# Patient Record
Sex: FEMALE | Race: WHITE | URBAN - METROPOLITAN AREA
[De-identification: names, ages, dates, MRNs, and addresses within clinical notes are randomized per-mention and may not be internally consistent; named-entity substitution may affect disease eponyms.]

---

## 2017-01-12 ASSESSMENT — ENCOUNTER SYMPTOMS
DYSPNEA ON EXERTION: 0
MYALGIAS: 1
NAIL CHANGES: 0
WEIGHT LOSS: 0
DYSURIA: 0
FATIGUE: 1
SNORES LOUDLY: 1
HEMATURIA: 0
ARTHRALGIAS: 1
POOR WOUND HEALING: 0
RESPIRATORY PAIN: 0
SKIN CHANGES: 0
MUSCLE WEAKNESS: 0
STIFFNESS: 0
BREAST PAIN: 1
CHILLS: 0
HALLUCINATIONS: 0
INCREASED ENERGY: 0
DECREASED APPETITE: 0
NECK PAIN: 1
SHORTNESS OF BREATH: 0
COUGH DISTURBING SLEEP: 0
BREAST MASS: 1
WEIGHT GAIN: 1
HEMOPTYSIS: 0
FLANK PAIN: 0
NIGHT SWEATS: 1
JOINT SWELLING: 0
DIFFICULTY URINATING: 0
COUGH: 1
POSTURAL DYSPNEA: 0
SPUTUM PRODUCTION: 1
FEVER: 0
POLYPHAGIA: 0
POLYDIPSIA: 0
MUSCLE CRAMPS: 1
ALTERED TEMPERATURE REGULATION: 1
WHEEZING: 0
BACK PAIN: 1

## 2017-01-17 ENCOUNTER — ONCOLOGY VISIT (OUTPATIENT)
Dept: ONCOLOGY | Facility: CLINIC | Age: 37
End: 2017-01-17
Attending: INTERNAL MEDICINE
Payer: COMMERCIAL

## 2017-01-17 VITALS
HEIGHT: 67 IN | SYSTOLIC BLOOD PRESSURE: 129 MMHG | BODY MASS INDEX: 45.99 KG/M2 | RESPIRATION RATE: 16 BRPM | OXYGEN SATURATION: 95 % | HEART RATE: 95 BPM | DIASTOLIC BLOOD PRESSURE: 86 MMHG | WEIGHT: 293 LBS | TEMPERATURE: 98.8 F

## 2017-01-17 DIAGNOSIS — D89.42 IDIOPATHIC MAST CELL ACTIVATION SYNDROME (H): Primary | Chronic | ICD-10-CM

## 2017-01-17 PROCEDURE — 99213 OFFICE O/P EST LOW 20 MIN: CPT

## 2017-01-17 PROCEDURE — 99213 OFFICE O/P EST LOW 20 MIN: CPT | Mod: ZP | Performed by: INTERNAL MEDICINE

## 2017-01-17 RX ORDER — CETIRIZINE HYDROCHLORIDE 10 MG/1
10 TABLET ORAL DAILY
Qty: 30 TABLET | Refills: 11 | COMMUNITY
Start: 2017-01-17

## 2017-01-17 RX ORDER — NYSTATIN 100000 U/G
CREAM TOPICAL DAILY PRN
COMMUNITY
Start: 2016-02-17

## 2017-01-17 RX ORDER — PROMETHAZINE HYDROCHLORIDE 25 MG/1
25 TABLET ORAL AT BEDTIME
Qty: 56 TABLET | Refills: 11 | COMMUNITY
Start: 2017-01-17

## 2017-01-17 RX ORDER — ALBUTEROL SULFATE 0.83 MG/ML
2.5 SOLUTION RESPIRATORY (INHALATION) DAILY PRN
COMMUNITY
Start: 2016-04-11

## 2017-01-17 RX ORDER — LANCETS
EACH MISCELLANEOUS
COMMUNITY
Start: 2007-08-15

## 2017-01-17 RX ORDER — KETOCONAZOLE 20 MG/ML
SHAMPOO TOPICAL DAILY PRN
COMMUNITY
Start: 2016-08-19

## 2017-01-17 RX ORDER — HYDROXYZINE HYDROCHLORIDE 10 MG/1
10 TABLET, FILM COATED ORAL DAILY PRN
COMMUNITY

## 2017-01-17 RX ORDER — EPINEPHRINE 0.3 MG/.3ML
0.3 INJECTION SUBCUTANEOUS DAILY PRN
COMMUNITY
Start: 2015-08-31

## 2017-01-17 RX ORDER — NAPROXEN SODIUM 220 MG
TABLET ORAL
COMMUNITY
Start: 2016-09-22

## 2017-01-17 RX ORDER — CETIRIZINE HYDROCHLORIDE 10 MG/1
10 TABLET ORAL 2 TIMES DAILY
COMMUNITY
End: 2017-01-17

## 2017-01-17 ASSESSMENT — PAIN SCALES - GENERAL: PAINLEVEL: NO PAIN (0)

## 2017-01-17 NOTE — NURSING NOTE
"Cheli Figueroa is a 36 year old female who presents for:  Chief Complaint   Patient presents with     Oncology Clinic Visit     Mast cell activation f/u         Initial Vitals:  /86 mmHg  Pulse 95  Temp(Src) 98.8  F (37.1  C) (Oral)  Resp 16  Ht 1.702 m (5' 7.01\")  Wt 133.539 kg (294 lb 6.4 oz)  BMI 46.10 kg/m2  SpO2 95%  LMP 12/22/2016 Estimated body mass index is 46.1 kg/(m^2) as calculated from the following:    Height as of this encounter: 1.702 m (5' 7.01\").    Weight as of this encounter: 133.539 kg (294 lb 6.4 oz).. Body surface area is 2.51 meters squared. BP completed using cuff size: X-large  No Pain (0) Patient's last menstrual period was 12/22/2016. Allergies and medications reviewed.     Medications: Medication refills not needed today.  Pharmacy name entered into [employer]:    [facility] [place], [place] - [phone]  [facility] - [place], [place] - [address]  [facility] [place], [place] - [address]  [id number] - [place], [place] - [address] AT [facility] [address]  [id number] - [place], [place] - [address] [facility] [address]  [facility] [place], [place] - [address] [facility] [address]  [id number] - [place] - [address] [facility] [place] - [address] [facility] - [place], [place] - [address]  [facility] - [place], [place] - [address]    Comments:     10 minutes for nursing intake (face to face time)   Montserrat Craig LPN        "
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
## 2017-04-03 ENCOUNTER — OFFICE VISIT (OUTPATIENT)
Dept: FAMILY MEDICINE | Facility: CLINIC | Age: 37
End: 2017-04-03
Payer: COMMERCIAL

## 2017-04-03 VITALS
WEIGHT: 284 LBS | SYSTOLIC BLOOD PRESSURE: 122 MMHG | BODY MASS INDEX: 44.57 KG/M2 | HEART RATE: 83 BPM | TEMPERATURE: 99.3 F | OXYGEN SATURATION: 97 % | DIASTOLIC BLOOD PRESSURE: 83 MMHG | HEIGHT: 67 IN

## 2017-04-03 DIAGNOSIS — J03.90 TONSILLITIS: ICD-10-CM

## 2017-04-03 DIAGNOSIS — R07.0 THROAT PAIN: Primary | ICD-10-CM

## 2017-04-03 DIAGNOSIS — E10.9 TYPE 1 DIABETES MELLITUS WITHOUT COMPLICATION (H): ICD-10-CM

## 2017-04-03 DIAGNOSIS — D89.42 IDIOPATHIC MAST CELL ACTIVATION SYNDROME (H): Chronic | ICD-10-CM

## 2017-04-03 LAB
DEPRECATED S PYO AG THROAT QL EIA: NORMAL
MICRO REPORT STATUS: NORMAL
SPECIMEN SOURCE: NORMAL

## 2017-04-03 PROCEDURE — 87081 CULTURE SCREEN ONLY: CPT | Performed by: FAMILY MEDICINE

## 2017-04-03 PROCEDURE — 87880 STREP A ASSAY W/OPTIC: CPT | Performed by: FAMILY MEDICINE

## 2017-04-03 PROCEDURE — 99214 OFFICE O/P EST MOD 30 MIN: CPT | Performed by: FAMILY MEDICINE

## 2017-04-03 RX ORDER — AMOXICILLIN 875 MG
875 TABLET ORAL 2 TIMES DAILY
Qty: 14 TABLET | Refills: 0 | Status: SHIPPED | OUTPATIENT
Start: 2017-04-03 | End: 2017-04-10

## 2017-04-03 NOTE — PROGRESS NOTES
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 Hx of tonsillitis. In past     different form previous    Sugars been good     Took a couple correction doses for seeming no reason usual reason when she is sick          Takes prn med's   Only regular med's are as follows :   Uses allegra and montelukast twice a day   Ativan twice a day lowered dose recently   And ranitidine twice a day for mast cell     Regular levothyroxine post surgery for an enlarged thyroid( goiter )      Under care of endocrine at [facility], , hematology at the  and regular primary at [facility]     She is just here for an acute visit     Problem list and histories reviewed & adjusted, as indicated.  Additional history: as documented    Patient Active Problem List   Diagnosis     Postsurgical hypothyroidism     CARDIOVASCULAR SCREENING; LDL GOAL LESS THAN 70     Type 1 diabetes, HbA1c goal < 7% (H)     Mild persistent asthma     Morbid obesity (H)     Other procreative management counseling and advice     Pain in limb     Idiopathic mast cell activation syndrome     Attention deficit disorder     Atopic rhinitis     Diarrhea     Gastroparesis     Generalized anxiety disorder     Hyperlipidemia     Lumbar radiculopathy     Major depressive  "disorder, single episode, moderate (H)     Extrinsic asthma     Nausea and vomiting     Posttraumatic stress disorder     Type 1 diabetes mellitus (H)     Vitamin D deficiency     Nausea & vomiting     Past Surgical History:   Procedure Laterality Date     CHOLECYSTECTOMY  6/2010     COLONOSCOPY       FOOT SURGERY  2003    L foot surgery, had rheumatoid nodule removed     GASTROSCOPY,[place]      multiple     HC BREATH HYDROGEN TEST  12/28/2011    Procedure:HYDROGEN BREATH TEST; Surgeon:GILLIAN VALADEZ; Location:[facility]     THYROIDECTOMY  11/2010     TUBAL LIGATION  2003       Social History   Substance Use Topics     Smoking status: Former Smoker     Quit date: 4/2/2011     Smokeless tobacco: Never Used     Alcohol use No     Family History   Problem Relation Age of Onset     DIABETES Father      No FHx of RA, SLE or malignancy     DIABETES Paternal Grandmother          Current Outpatient Prescriptions   Medication Sig Dispense Refill     montelukast (SINGULAIR) 10 MG tablet Take 1 tablet (10 mg) by mouth 2 times daily 180 tablet 3     insulin infusion pump (Bloc VIBE) kit Insulin pump       Continuous Glucose Monitor (DEXCOM G4 PLATINUM ) KIT        EPINEPHrine 0.3 MG/0.3ML injection Inject 0.3 mg into the muscle daily as needed       glucagon (GLUCAGON EMERGENCY) 1 MG kit Inject 1 mg Subcutaneous daily as needed       infusion set (INSET 23\" 6MM) misc pump supply Infusion Set 23 \" 6MM       insulin aspart (NOVOLOG VIAL) 100 UNITS/ML injection 64 basal units and sliding scale for bolus       insulin syringe 31G X 5/16\" 0.5 ML MISC        albuterol (2.5 MG/3ML) 0.083% neb solution Inhale 2.5 mg into the lungs daily as needed       blood glucose monitoring (ONETOUCH VERIO IQ SYSTEM) meter device kit        hydrOXYzine (ATARAX) 10 MG tablet Take 10 mg by mouth daily as needed       blood glucose monitoring (ONE TOUCH ULTRASOFT) lancets        nystatin (MYCOSTATIN) cream Apply topically daily as needed       " Respiratory Therapy Supplies (NEBULIZER) MARTHA        ketoconazole (NIZORAL) 2 % shampoo daily as needed       promethazine (PHENERGAN) 6.25 MG/5ML syrup daily as needed       cetirizine (ZYRTEC) 10 MG tablet Take 1 tablet (10 mg) by mouth daily 30 tablet 11     promethazine (PHENERGAN) 25 MG tablet Take 1 tablet (25 mg) by mouth At Bedtime 56 tablet 11     LORazepam (ATIVAN) 1 MG tablet Take 1 tablet (1 mg) by mouth 2 times daily 60 tablet 0     ranitidine (ZANTAC) 300 MG tablet Take 1 tablet (300 mg) by mouth 2 times daily 60 tablet 0     hydrOXYzine (VISTARIL) 25 MG capsule Take 2 capsules (50 mg) by mouth daily as needed 120 capsule 0     fluticasone-salmeterol (ADVAIR) 250-50 MCG/DOSE diskus inhaler Inhale 1 puff into the lungs 2 times daily as needed 1 Inhaler 0     diphenhydrAMINE (BENADRYL) 12.5 MG/5ML elixir Take 15 mLs by mouth 4 times daily as needed for allergies or sleep       Levothyroxine Sodium (SYNTHROID PO) Take 200 mcg by mouth daily       albuterol (PROAIR HFA) 108 (90 BASE) MCG/ACT inhaler Inhale 1-2 puffs into the lungs every 6 hours as needed. 1 Inhaler 2     ASPIRIN NOT PRESCRIBED, INTENTIONAL, Antiplatelet medication not prescribed intentionally due to age  0     ipratropium - albuterol 0.5 mg/2.5 mg/3 mL (DUONEB) 0.5-2.5 (3) MG/3ML nebulization Take 3 mLs by nebulization every 6 hours as needed for shortness of breath / dyspnea. 1 Box 3     Allergies   Allergen Reactions     Aspirin Anaphylaxis     Ibuprofen Anaphylaxis and Hives     Other reaction(s): Wheezing  SWELLING     Morphine Hives     Doxycycline Nausea and Vomiting     Other reaction(s): Gastrointestinal  Heart palpitations  Patient developed nausea and vomiting after she was started on Doxycycline. After discussion patient agreed to have medication be added to her allergy list.      Insulin Lispro      Other reaction(s): Other, see comments  Systemic allergic reaction and flare of mast cell disease.  Tolerates Novalog well but  UNABLE TO TOLERATE HUMALOG     Vicodin [Hydrocodone-Acetaminophen] Itching     Other reaction(s): Other, see comments     Biaxin [Clarithromycin] Palpitations     Other reaction(s): Gastrointestinal, Palpitations  PN Noted: [id number]  Swelling and hives     Cephalexin Palpitations     Other reaction(s): Gastrointestinal, Other, see comments  Heart palpitations  Dizzy, Shaky  Questionable allergy  Swelling and hives     Levaquin [Levofloxacin] Anxiety     Other reaction(s): Anxiety  Swelling and hives  Other reaction(s): Gastrointestinal  Heart palpitations  Swelling and hives     Recent Labs   Lab Test  06/14/16   0739  06/13/16   0646  06/12/16   1313  06/26/15   1406 08/15/13  01/22/13   1235   08/30/12   1305  04/20/12   0939   A1C   --   7.6*   --    --   7.9*   --    --   7.1*  7.7*   LDL   --    --    --    --   132   --    --    --   90   HDL   --    --    --    --   65   --    --    --   37*   TRIG   --    --    --    --   120   --    --    --   136   ALT   --    --   21  21   --   29   < >   --    --    CR  0.84  0.76  0.83  0.92   --   0.72   < >   --   0.75   GFRESTIMATED  76  86  77  70   --   >90   < >   --   90   GFRESTBLACK  >90   GFR Calc    >90   GFR Calc    >90   GFR Calc    84   --   >90   < >   --   >90   POTASSIUM  3.6  3.6  3.8  3.8   --   5.3   < >   --   4.4   TSH   --    --    --    --   59.08   --    --   0.12*   --     < > = values in this interval not displayed.      BP Readings from Last 3 Encounters:   04/03/17 122/83   01/17/17 129/86   07/13/16 163/88    Wt Readings from Last 3 Encounters:   04/03/17 284 lb (128.8 kg)   01/17/17 294 lb 6.4 oz (133.5 kg)   07/13/16 276 lb 9.6 oz (125.5 kg)                  Labs reviewed in EPIC    Reviewed and updated as needed this visit by clinical staff  Tobacco  Allergies  Meds  Med Hx  Surg Hx  Fam Hx  Soc Hx      Reviewed and updated as needed this visit by Provider      "    ROS:  Constitutional, HEENT, cardiovascular, pulmonary, GI, , musculoskeletal, neuro, skin, endocrine and psych systems are negative, except as otherwise noted.    OBJECTIVE:                                                    /83  Pulse 83  Temp 99.3  F (37.4  C) (Oral)  Ht 5' 7.01\" (1.702 m)  Wt 284 lb (128.8 kg)  SpO2 97%  BMI 44.47 kg/m2  Body mass index is 44.47 kg/(m^2).  GENERAL: healthy, alert and no distress  EYES: Eyes grossly normal to inspection, PERRL and conjunctivae and sclerae normal  HENT: normal cephalic/atraumatic, ear canals and TM's normal, nose and mouth without ulcers or lesions, oropharynx clear, oral mucous membranes moist, tonsillar hypertrophy and tonsillar erythema, no exudate or mid line shift or asymmetry seen, see old carters on tonsils, voice hoarse  NECK: no adenopathy, no asymmetry, masses, scars of previous thyroidectomy surgery present anterior neck   RESP: lungs clear to auscultation - no rales, rhonchi or wheezes  CV: regular rate and rhythm, normal S1 S2, no S3 or S4, no murmur, click or rub,ABDOMEN: soft, non tender, no hepatosplenomegaly, no masses and bowel sounds normal  MS: no gross musculoskeletal defects noted  SKIN: no suspicious lesions or rashes  NEURO: Normal strength and tone, mentation intact and speech normal  PSYCH: mentation appears normal, affect normal/bright    Diagnostic Test Results:  Results for orders placed or performed in visit on 04/03/17 (from the past 24 hour(s))   Strep, Rapid Screen   Result Value Ref Range    Specimen Description Throat     Rapid Strep A Screen       NEGATIVE: No Group A streptococcal antigen detected by immunoassay, await   culture report.      Micro Report Status FINAL 04/03/2017         ASSESSMENT/PLAN:                                                      1. Throat pain  Hx of mild persistent asthma, on Singulair, albuterol, DUONEB prn, atrophic rhinitis, type 1 DM on insulin pump and glucagon prn and dexa com " continuous monitoring, HDL, gastroparesis, GERD, post op hypothyroidism on levothyroxine ( thyroid removed due to benign goiter ) managed by endocrine, idiopathic mast cell activation syndrome managed by hematology, ADD, KEL< on lorazepam now down to bid, MDD, PTSD, lumbar radiculopathy, former smoker, prior TL, prior cholecystectomy, prior colonoscopy, remote JANET & Rf positive in the past , multiple allergies and intolerance. Including palpitations with keflex but reports tolerates all penicillins and amoxil fine. Hx of Enhler danlos hypermobility type, seen by hematology 1/17/17 note reviewed. [place] shows [place] lorazepam now down to 0,5 mg # 60 monthly last on 3/3/17 , phentermine 37.5 mg # 15 on 2/28/17 and tramadol 50 mg # 15 on 3/13/17. Here today for sore throat  Exam benign, strep negative. likely viral but given concern about left tonsil being re and larger though both look same to me and just show craters of past infection , given script of amoxil to hang on to.   Rapid strep came back negative. Will call if culture positive. Supportive care. Flonase 1 spray each nose daily 2 weeks to dry up post nasal drip that is likely causing sore throat. Continue resr of med's as is. If not better in 5 days fill scrip for amoxil with food 7 days. Continue care with endocrine, hematology and primary. Go to the ER if worse   - Strep, Rapid Screen  - amoxicillin (AMOXIL) 875 MG tablet; Take 1 tablet (875 mg) by mouth 2 times daily for 7 days  Dispense: 14 tablet; Refill: 0  - Beta strep group A culture    2. Tonsillitis  Likely viral   - amoxicillin (AMOXIL) 875 MG tablet; Take 1 tablet (875 mg) by mouth 2 times daily for 7 days  Dispense: 14 tablet; Refill: 0    3. Idiopathic mast cell activation syndrome  Stable continue care with hematology    4. Type 1 diabetes mellitus without complication (H)  Stable under care of endocrine     5. Asthma   Stable on med's       See Patient Instructions  Patient Instructions      Rapid strep came back negative  Will call if culture positive  Supportive care  flonase 1 spray each nose daily 2 weeks   Continue resr of meds  If not better in 5 days fill scrip for amoxil with food 7 days   Continue care with endocrine, hematology and primary   Go to the ER if worse       Mayuri Navarrete MD  [facility] [place]

## 2017-04-03 NOTE — NURSING NOTE
"Chief Complaint   Patient presents with     Pharyngitis     Initial /83  Pulse 83  Temp 99.3  F (37.4  C) (Oral)  Ht 5' 7.01\" (1.702 m)  Wt 284 lb (128.8 kg)  SpO2 97%  BMI 44.47 kg/m2 Estimated body mass index is 44.47 kg/(m^2) as calculated from the following:    Height as of this encounter: 5' 7.01\" (1.702 m).    Weight as of this encounter: 284 lb (128.8 kg)..  BP completed using cuff size: chris Cheatham MA   "

## 2017-04-03 NOTE — PATIENT INSTRUCTIONS
Rapid strep came back negative  Will call if culture positive  Supportive care  flonase 1 spray each nose daily 2 weeks   Continue resr of meds  If not better in 5 days fill scrip for amoxil with food 7 days   Continue care with endocrine, hematology and primary   Go to the ER if worse

## 2017-04-03 NOTE — MR AVS SNAPSHOT
After Visit Summary   4/3/2017    Cheli Figueroa    MRN: [id number]           Patient Information     Date Of Birth          1980        Visit Information        Provider Department      4/3/2017 2:00 PM Mayuri Navarrete MD [facility]        Today's Diagnoses     Throat pain    -  1    Tonsillitis        Idiopathic mast cell activation syndrome        Type 1 diabetes mellitus without complication (H)          Care Instructions      Rapid strep came back negative  Will call if culture positive  Supportive care  flonase 1 spray each nose daily 2 weeks   Continue resr of meds  If not better in 5 days fill scrip for amoxil with food 7 days   Continue care with endocrine, hematology and primary   Go to the ER if worse         Follow-ups after your visit        Your next 10 appointments already scheduled     Jan 19, 2018  9:30 AM CST   (Arrive by 9:15 AM)   Return Visit with Gary Washington MD   [facility] ([facility])    [address] [place] [address]  [address] [facility] [address] [phone]              Who to contact     If you have questions or need follow up information about today's clinic visit or your schedule please contact [facility] directly at [phone].  Normal or non-critical lab and imaging results will be communicated to you by MyChart, letter or phone within 4 business days after the clinic has received the results. If you do not hear from us within 7 days, please contact the clinic through MyChart or phone. If you have a critical or abnormal lab result, we will notify you by phone as soon as possible.  Submit refill requests through [employer] or call your pharmacy and they will forward the refill request to us. Please allow 3 business days for your refill to be completed.          Additional Information About Your Visit        niviohart Information     [employer] gives you secure access to your electronic  "health record. If you see a primary care provider, you can also send messages to your care team and make appointments. If you have questions, please call your primary care clinic.  If you do not have a primary care provider, please call [phone] and they will assist you.        Care EveryWhere ID     This is your Care EveryWhere ID. This could be used by other organizations to access your [place] medical records  [id number]        Your Vitals Were     Pulse Temperature Height Pulse Oximetry BMI (Body Mass Index)       83 99.3  F (37.4  C) (Oral) 5' 7.01\" (1.702 m) 97% 44.47 kg/m2        Blood Pressure from Last 3 Encounters:   04/03/17 122/83   01/17/17 129/86   07/13/16 163/88    Weight from Last 3 Encounters:   04/03/17 284 lb (128.8 kg)   01/17/17 294 lb 6.4 oz (133.5 kg)   07/13/16 276 lb 9.6 oz (125.5 kg)              We Performed the Following     Beta strep group A culture     Strep, Rapid Screen          Today's Medication Changes          These changes are accurate as of: 4/3/17  2:35 PM.  If you have any questions, ask your nurse or doctor.               Start taking these medicines.        Dose/Directions    amoxicillin 875 MG tablet   Commonly known as:  AMOXIL   Used for:  Throat pain, Tonsillitis   Started by:  Mayuri Navarrete MD        Dose:  875 mg   Take 1 tablet (875 mg) by mouth 2 times daily for 7 days   Quantity:  14 tablet   Refills:  0            Where to get your medicines      Some of these will need a paper prescription and others can be bought over the counter.  Ask your nurse if you have questions.     Bring a paper prescription for each of these medications     amoxicillin 875 MG tablet                Primary Care Provider Office Phone # Fax #    Frances Schroeder [phone]       [facility] [address]  [facility] [address]        Thank you!     Thank you for choosing [facility]  for your care. Our goal is always to " provide you with excellent care. Hearing back from our patients is one way we can continue to improve our services. Please take a few minutes to complete the written survey that you may receive in the mail after your visit with us. Thank you!             Your Updated Medication List - Protect others around you: Learn how to safely use, store and throw away your medicines at www.disposemymeds.org.          This list is accurate as of: 4/3/17  2:35 PM.  Always use your most recent med list.                   Brand Name Dispense Instructions for use    * albuterol 108 (90 BASE) MCG/ACT Inhaler   Generic drug:  albuterol     1 Inhaler    Inhale 1-2 puffs into the lungs every 6 hours as needed.       * albuterol (2.5 MG/3ML) 0.083% neb solution      Inhale 2.5 mg into the lungs daily as needed       amoxicillin 875 MG tablet    AMOXIL    14 tablet    Take 1 tablet (875 mg) by mouth 2 times daily for 7 days       ASPIRIN NOT PRESCRIBED    INTENTIONAL     Antiplatelet medication not prescribed intentionally due to age       ATIVAN 1 MG tablet   Generic drug:  LORazepam     60 tablet    Take 1 tablet (1 mg) by mouth 2 times daily       blood glucose monitoring lancets          blood glucose monitoring meter device kit          cetirizine 10 MG tablet    zyrTEC    30 tablet    Take 1 tablet (10 mg) by mouth daily       DEXCOM G4 PLATINUM  Kit          diphenhydrAMINE 12.5 MG/5ML solution    BENADRYL     Take 15 mLs by mouth 4 times daily as needed for allergies or sleep       EPINEPHrine 0.3 MG/0.3ML injection      Inject 0.3 mg into the muscle daily as needed       fluticasone-salmeterol 250-50 MCG/DOSE diskus inhaler    ADVAIR    1 Inhaler    Inhale 1 puff into the lungs 2 times daily as needed       GLUCAGON EMERGENCY 1 MG kit   Generic drug:  glucagon      Inject 1 mg Subcutaneous daily as needed       hydrOXYzine 10 MG tablet    ATARAX     Take 10 mg by mouth daily as needed       hydrOXYzine 25 MG capsule     "VISTARIL    120 capsule    Take 2 capsules (50 mg) by mouth daily as needed       infusion set [facility] pump supply      Infusion Set 23 \" 6MM       insulin infusion pump kit      Insulin pump       insulin syringe 31G X 5/16\" 0.5 ML Misc          ipratropium - albuterol 0.5 mg/2.5 mg/3 mL 0.5-2.5 (3) MG/3ML neb solution    DUONEB    1 Box    Take 3 mLs by nebulization every 6 hours as needed for shortness of breath / dyspnea.       ketoconazole 2 % shampoo    NIZORAL     daily as needed       montelukast 10 MG tablet    SINGULAIR    180 tablet    Take 1 tablet (10 mg) by mouth 2 times daily       nebulizer Debbie          NovoLOG VIAL 100 UNITS/ML injection   Generic drug:  insulin aspart      64 basal units and sliding scale for bolus       nystatin cream    MYCOSTATIN     Apply topically daily as needed       * promethazine 6.25 MG/5ML syrup    PHENERGAN     daily as needed       * promethazine 25 MG tablet    PHENERGAN    56 tablet    Take 1 tablet (25 mg) by mouth At Bedtime       SYNTHROID PO      Take 200 mcg by mouth daily       ZANTAC 300 MG tablet   Generic drug:  ranitidine     60 tablet    Take 1 tablet (300 mg) by mouth 2 times daily       * Notice:  This list has 4 medication(s) that are the same as other medications prescribed for you. Read the directions carefully, and ask your doctor or other care provider to review them with you.      "

## 2017-04-05 LAB
BACTERIA SPEC CULT: NORMAL
MICRO REPORT STATUS: NORMAL
SPECIMEN SOURCE: NORMAL

## 2017-04-05 NOTE — PROGRESS NOTES
Chris Waite Henry,  Your results came back negative for strep. Likely viral infection and not sure if antibiotic will really help. If you have any further concerns please do not hesitate to contact us by message, phone or making an appointment.  Have a good day   Dr Landon WEINBERG

## 2017-06-13 ENCOUNTER — OFFICE VISIT (OUTPATIENT)
Dept: FAMILY MEDICINE | Facility: CLINIC | Age: 37
End: 2017-06-13
Payer: COMMERCIAL

## 2017-06-13 VITALS
SYSTOLIC BLOOD PRESSURE: 120 MMHG | HEART RATE: 90 BPM | TEMPERATURE: 99.4 F | RESPIRATION RATE: 18 BRPM | BODY MASS INDEX: 46.42 KG/M2 | OXYGEN SATURATION: 97 % | WEIGHT: 293 LBS | DIASTOLIC BLOOD PRESSURE: 66 MMHG

## 2017-06-13 DIAGNOSIS — L72.3 SEBACEOUS CYST: Primary | ICD-10-CM

## 2017-06-13 PROCEDURE — 99213 OFFICE O/P EST LOW 20 MIN: CPT | Performed by: PHYSICIAN ASSISTANT

## 2017-06-13 NOTE — MR AVS SNAPSHOT
After Visit Summary   6/13/2017    Cheli Figueroa    MRN: [id number]           Patient Information     Date Of Birth          1980        Visit Information        Provider Department      6/13/2017 3:40 PM Tenisha Jason PA-C [facility]        Today's Diagnoses     Sebaceous cyst    -  1      Care Instructions    Keep area clean and dry.  No need for topical or oral antibiotic at this time.    Consider follow up with dermatology for possible I&D if no improvement with above x 1-2 weeks or with any worsening or changes in symptoms.          Follow-ups after your visit        Follow-up notes from your care team     Return if symptoms worsen or fail to improve.      Your next 10 appointments already scheduled     Jan 19, 2018  9:30 AM CST   (Arrive by 9:15 AM)   Return Visit with Gary Washington MD   [facility] ([facility])    [address] [place] [id number] [phone]              Who to contact     If you have questions or need follow up information about today's clinic visit or your schedule please contact [facility] directly at [phone].  Normal or non-critical lab and imaging results will be communicated to you by MyChart, letter or phone within 4 business days after the clinic has received the results. If you do not hear from us within 7 days, please contact the clinic through MyChart or phone. If you have a critical or abnormal lab result, we will notify you by phone as soon as possible.  Submit refill requests through [employer] or call your pharmacy and they will forward the refill request to us. Please allow 3 business days for your refill to be completed.          Additional Information About Your Visit        MyChart Information     [employer] gives you secure access to your electronic health record. If you see a primary care provider, you can also send messages to  your care team and make appointments. If you have questions, please call your primary care clinic.  If you do not have a primary care provider, please call [phone] and they will assist you.        Care EveryWhere ID     This is your Care EveryWhere ID. This could be used by other organizations to access your [facility] medical records  [id number]        Your Vitals Were     Pulse Temperature Respirations Pulse Oximetry BMI (Body Mass Index)       90 99.4  F (37.4  C) (Oral) 18 97% 46.42 kg/m2        Blood Pressure from Last 3 Encounters:   06/13/17 120/66   04/03/17 122/83   01/17/17 129/86    Weight from Last 3 Encounters:   06/13/17 296 lb 8 oz (134.5 kg)   04/03/17 284 lb (128.8 kg)   01/17/17 294 lb 6.4 oz (133.5 kg)              Today, you had the following     No orders found for display       Primary Care Provider Office Phone # Fax #    Frances Schroeder [phone]       [facility] [address]  [facility] [address]        Thank you!     Thank you for choosing [facility]  for your care. Our goal is always to provide you with excellent care. Hearing back from our patients is one way we can continue to improve our services. Please take a few minutes to complete the written survey that you may receive in the mail after your visit with us. Thank you!             Your Updated Medication List - Protect others around you: Learn how to safely use, store and throw away your medicines at www.disposemymeds.org.          This list is accurate as of: 6/13/17  3:55 PM.  Always use your most recent med list.                   Brand Name Dispense Instructions for use    * albuterol 108 (90 BASE) MCG/ACT Inhaler   Generic drug:  albuterol     1 Inhaler    Inhale 1-2 puffs into the lungs every 6 hours as needed.       * albuterol (2.5 MG/3ML) 0.083% neb solution      Inhale 2.5 mg into the lungs daily as needed       ASPIRIN NOT PRESCRIBED    INTENTIONAL      "Antiplatelet medication not prescribed intentionally due to age       ATIVAN 1 MG tablet   Generic drug:  LORazepam     60 tablet    Take 1 tablet (1 mg) by mouth 2 times daily       blood glucose monitoring lancets          blood glucose monitoring meter device kit          cetirizine 10 MG tablet    zyrTEC    30 tablet    Take 1 tablet (10 mg) by mouth daily       DEXCOM G4 PLATINUM  Kit          diphenhydrAMINE 12.5 MG/5ML solution    BENADRYL     Take 15 mLs by mouth 4 times daily as needed for allergies or sleep       EPINEPHrine 0.3 MG/0.3ML injection      Inject 0.3 mg into the muscle daily as needed       fluticasone-salmeterol 250-50 MCG/DOSE diskus inhaler    ADVAIR    1 Inhaler    Inhale 1 puff into the lungs 2 times daily as needed       GLUCAGON EMERGENCY 1 MG kit   Generic drug:  glucagon      Inject 1 mg Subcutaneous daily as needed       hydrOXYzine 10 MG tablet    ATARAX     Take 10 mg by mouth daily as needed       hydrOXYzine 25 MG capsule    VISTARIL    120 capsule    Take 2 capsules (50 mg) by mouth daily as needed       infusion set misc pump supply      Infusion Set 23 \" 6MM       insulin infusion pump kit      Insulin pump       insulin syringe 31G X 5/16\" 0.5 ML Misc          ipratropium - albuterol 0.5 mg/2.5 mg/3 mL 0.5-2.5 (3) MG/3ML neb solution    DUONEB    1 Box    Take 3 mLs by nebulization every 6 hours as needed for shortness of breath / dyspnea.       ketoconazole 2 % shampoo    NIZORAL     daily as needed       montelukast 10 MG tablet    SINGULAIR    180 tablet    Take 1 tablet (10 mg) by mouth 2 times daily       nebulizer Debbie          NovoLOG VIAL 100 UNITS/ML injection   Generic drug:  insulin aspart      64 basal units and sliding scale for bolus       nystatin cream    MYCOSTATIN     Apply topically daily as needed       * promethazine 6.25 MG/5ML syrup    PHENERGAN     daily as needed       * promethazine 25 MG tablet    PHENERGAN    56 tablet    Take 1 tablet (25 " mg) by mouth At Bedtime       SYNTHROID PO      Take 200 mcg by mouth daily       ZANTAC 300 MG tablet   Generic drug:  ranitidine     60 tablet    Take 1 tablet (300 mg) by mouth 2 times daily       * Notice:  This list has 4 medication(s) that are the same as other medications prescribed for you. Read the directions carefully, and ask your doctor or other care provider to review them with you.

## 2017-06-13 NOTE — PATIENT INSTRUCTIONS
Keep area clean and dry.  No need for topical or oral antibiotic at this time.  Encouraged warm compresses as needed as well.    Consider follow up with dermatology for possible I&D if no improvement with above x 1-2 weeks or with any worsening or changes in symptoms.

## 2017-06-13 NOTE — NURSING NOTE
"Chief Complaint   Patient presents with     Derm Problem       Initial /66 (BP Location: Right arm, Patient Position: Sitting, Cuff Size: Adult Large)  Pulse 90  Temp 99.4  F (37.4  C) (Oral)  Resp 18  Wt 296 lb 8 oz (134.5 kg)  SpO2 97%  BMI 46.42 kg/m2 Estimated body mass index is 46.42 kg/(m^2) as calculated from the following:    Height as of 4/3/17: 5' 7.01\" (1.702 m).    Weight as of this encounter: 296 lb 8 oz (134.5 kg).  Medication Reconciliation: complete     Anton Brenner CMA  "

## 2017-06-13 NOTE — PROGRESS NOTES
SUBJECTIVE:                                                    Cheli Figueroa is a 36 year old female who presents to clinic today for the following health issues:      Wound      Duration: few days    Description (location/character/radiation): wound like under left breast    Intensity:  none    Accompanying signs and symptoms: looks open and looks like something is coming out of it, no pus    History (similar episodes/previous evaluation): hx of cysts/boils    Therapies tried and outcome: None    History of cysts occasionally, but current one is worse than others in the past           Problem list and histories reviewed & adjusted, as indicated.  Additional history: as documented    Patient Active Problem List   Diagnosis     Postsurgical hypothyroidism     CARDIOVASCULAR SCREENING; LDL GOAL LESS THAN 70     Type 1 diabetes, HbA1c goal < 7% (H)     Mild persistent asthma     Morbid obesity (H)     Other procreative management counseling and advice     Pain in limb     Idiopathic mast cell activation syndrome     Attention deficit disorder     Atopic rhinitis     Diarrhea     Gastroparesis     Generalized anxiety disorder     Hyperlipidemia     Lumbar radiculopathy     Major depressive disorder, single episode, moderate (H)     Extrinsic asthma     Nausea and vomiting     Posttraumatic stress disorder     Type 1 diabetes mellitus (H)     Vitamin D deficiency     Nausea & vomiting     Past Surgical History:   Procedure Laterality Date     CHOLECYSTECTOMY  6/2010     COLONOSCOPY       FOOT SURGERY  2003    L foot surgery, had rheumatoid nodule removed     GASTROSCOPY,[place]      multiple     HC BREATH HYDROGEN TEST  12/28/2011    Procedure:HYDROGEN BREATH TEST; Surgeon:GILLIAN VALADEZ; Location:[facility]     THYROIDECTOMY  11/2010     TUBAL LIGATION  2003       Social History   Substance Use Topics     Smoking status: Former Smoker     Quit date: 4/2/2011     Smokeless tobacco: Never Used     Alcohol use No      "Family History   Problem Relation Age of Onset     DIABETES Father      No FHx of RA, SLE or malignancy     DIABETES Paternal Grandmother          Current Outpatient Prescriptions   Medication Sig Dispense Refill     montelukast (SINGULAIR) 10 MG tablet Take 1 tablet (10 mg) by mouth 2 times daily 180 tablet 3     insulin infusion pump (ANIMAS VIBE) kit Insulin pump       Continuous Glucose Monitor (DEXCOM G4 PLATINUM ) KIT        EPINEPHrine 0.3 MG/0.3ML injection Inject 0.3 mg into the muscle daily as needed       glucagon (GLUCAGON EMERGENCY) 1 MG kit Inject 1 mg Subcutaneous daily as needed       infusion set (INSET 23\" 6MM) misc pump supply Infusion Set 23 \" 6MM       insulin aspart (NOVOLOG VIAL) 100 UNITS/ML injection 64 basal units and sliding scale for bolus       insulin syringe 31G X 5/16\" 0.5 ML MISC        albuterol (2.5 MG/3ML) 0.083% neb solution Inhale 2.5 mg into the lungs daily as needed       blood glucose monitoring (ONETOUCH VERIO IQ SYSTEM) meter device kit        hydrOXYzine (ATARAX) 10 MG tablet Take 10 mg by mouth daily as needed       blood glucose monitoring (ONE TOUCH ULTRASOFT) lancets        nystatin (MYCOSTATIN) cream Apply topically daily as needed       Respiratory Therapy Supplies (NEBULIZER) MARTHA        ketoconazole (NIZORAL) 2 % shampoo daily as needed       promethazine (PHENERGAN) 6.25 MG/5ML syrup daily as needed       cetirizine (ZYRTEC) 10 MG tablet Take 1 tablet (10 mg) by mouth daily 30 tablet 11     promethazine (PHENERGAN) 25 MG tablet Take 1 tablet (25 mg) by mouth At Bedtime 56 tablet 11     ranitidine (ZANTAC) 300 MG tablet Take 1 tablet (300 mg) by mouth 2 times daily 60 tablet 0     hydrOXYzine (VISTARIL) 25 MG capsule Take 2 capsules (50 mg) by mouth daily as needed 120 capsule 0     fluticasone-salmeterol (ADVAIR) 250-50 MCG/DOSE diskus inhaler Inhale 1 puff into the lungs 2 times daily as needed 1 Inhaler 0     diphenhydrAMINE (BENADRYL) 12.5 MG/5ML elixir " Take 15 mLs by mouth 4 times daily as needed for allergies or sleep       Levothyroxine Sodium (SYNTHROID PO) Take 200 mcg by mouth daily       albuterol (PROAIR HFA) 108 (90 BASE) MCG/ACT inhaler Inhale 1-2 puffs into the lungs every 6 hours as needed. 1 Inhaler 2     ASPIRIN NOT PRESCRIBED, INTENTIONAL, Antiplatelet medication not prescribed intentionally due to age  0     ipratropium - albuterol 0.5 mg/2.5 mg/3 mL (DUONEB) 0.5-2.5 (3) MG/3ML nebulization Take 3 mLs by nebulization every 6 hours as needed for shortness of breath / dyspnea. 1 Box 3     LORazepam (ATIVAN) 1 MG tablet Take 1 tablet (1 mg) by mouth 2 times daily 60 tablet 0     Allergies   Allergen Reactions     Aspirin Anaphylaxis     Ibuprofen Anaphylaxis and Hives     Other reaction(s): Wheezing  SWELLING     Morphine Hives     Doxycycline Nausea and Vomiting     Other reaction(s): Gastrointestinal  Heart palpitations  Patient developed nausea and vomiting after she was started on Doxycycline. After discussion patient agreed to have medication be added to her allergy list.      Insulin Lispro      Other reaction(s): Other, see comments  Systemic allergic reaction and flare of mast cell disease.  Tolerates Novalog well but UNABLE TO TOLERATE HUMALOG     Vicodin [Hydrocodone-Acetaminophen] Itching     Other reaction(s): Other, see comments     Biaxin [Clarithromycin] Palpitations     Other reaction(s): Gastrointestinal, Palpitations  PN Noted: [id number]  Swelling and hives     Cephalexin Palpitations     Other reaction(s): Gastrointestinal, Other, see comments  Heart palpitations  Dizzy, Shaky  Questionable allergy  Swelling and hives     Levaquin [Levofloxacin] Anxiety     Other reaction(s): Anxiety  Swelling and hives  Other reaction(s): Gastrointestinal  Heart palpitations  Swelling and hives       Reviewed and updated as needed this visit by clinical staff  Tobacco  Allergies  Meds  Problems  Med Hx  Surg Hx  Fam Hx  Soc Hx        Reviewed  and updated as needed this visit by Provider  Allergies  Meds  Problems         ROS:  Constitutional, HEENT, cardiovascular, pulmonary, gi and gu systems are negative, except as otherwise noted.    OBJECTIVE:                                                    /66 (BP Location: Right arm, Patient Position: Sitting, Cuff Size: Adult Large)  Pulse 90  Temp 99.4  F (37.4  C) (Oral)  Resp 18  Wt 296 lb 8 oz (134.5 kg)  SpO2 97%  BMI 46.42 kg/m2  Body mass index is 46.42 kg/(m^2).  GENERAL: healthy, alert and no distress  RESP: lungs clear to auscultation - no rales, rhonchi or wheezes  CV: regular rate and rhythm, normal S1 S2, no S3 or S4, no murmur, click or rub, no peripheral edema and peripheral pulses strong  SKIN: simple <0.5 cm diameter cyst with pinpoint pustule and slight erythema surrounding  PSYCH: mentation appears normal, affect normal/bright    Diagnostic Test Results:  none      ASSESSMENT/PLAN:                                                        ICD-10-CM    1. Sebaceous cyst L72.3        Patient Instructions   Keep area clean and dry.  No need for topical or oral antibiotic at this time.  Encouraged warm compresses as needed as well.    Consider follow up with dermatology for possible I&D if no improvement with above x 1-2 weeks or with any worsening or changes in symptoms.      Tenisha Jason PA-C  [facility]

## 2017-06-14 ASSESSMENT — ASTHMA QUESTIONNAIRES: ACT_TOTALSCORE: 21

## 2017-06-17 ENCOUNTER — OFFICE VISIT (OUTPATIENT)
Dept: URGENT CARE | Facility: URGENT CARE | Age: 37
End: 2017-06-17
Payer: COMMERCIAL

## 2017-06-17 VITALS
WEIGHT: 293 LBS | BODY MASS INDEX: 47.09 KG/M2 | HEIGHT: 66 IN | SYSTOLIC BLOOD PRESSURE: 119 MMHG | TEMPERATURE: 98.1 F | OXYGEN SATURATION: 97 % | DIASTOLIC BLOOD PRESSURE: 74 MMHG | HEART RATE: 91 BPM

## 2017-06-17 DIAGNOSIS — L60.0 INGROWN TOENAIL WITHOUT INFECTION: Primary | ICD-10-CM

## 2017-06-17 PROCEDURE — 99213 OFFICE O/P EST LOW 20 MIN: CPT | Performed by: PHYSICIAN ASSISTANT

## 2017-06-17 NOTE — PROGRESS NOTES
"SUBJECTIVE:  Cheli Figueroa is a 36 year old female who presents to the clinic today for a rash.  Onset of rash was a few days ago .   Rash is improving.  Location of the rash: toe right 2nd.  Quality/symptoms of rash: redness   Symptoms are mild and rash seems to be improving.  Previous history of a similar rash? No  Toenail has been irritating. She has cut nail and given toe soaks daily.     Associated symptoms include: fever at home and none here in clinic.     Past Medical History:   Diagnosis Date     JANET positive      Asthma      Diabetes mellitus     type I     GERD (gastroesophageal reflux disease)      Hypothyroidism      Rheumatoid factor positive      Thyroid nodule     benign nodules, s/p total thyroidectomy     Current Outpatient Prescriptions   Medication Sig Dispense Refill     montelukast (SINGULAIR) 10 MG tablet Take 1 tablet (10 mg) by mouth 2 times daily 180 tablet 3     insulin infusion pump (SkinkersE) kit Insulin pump       Continuous Glucose Monitor (DEXCOM G4 PLATINUM ) KIT        EPINEPHrine 0.3 MG/0.3ML injection Inject 0.3 mg into the muscle daily as needed       glucagon (GLUCAGON EMERGENCY) 1 MG kit Inject 1 mg Subcutaneous daily as needed       infusion set (INSET 23\" 6MM) misc pump supply Infusion Set 23 \" 6MM       insulin aspart (NOVOLOG VIAL) 100 UNITS/ML injection 64 basal units and sliding scale for bolus       insulin syringe 31G X 5/16\" 0.5 ML MISC        albuterol (2.5 MG/3ML) 0.083% neb solution Inhale 2.5 mg into the lungs daily as needed       blood glucose monitoring (ONETOUCH VERIO IQ SYSTEM) meter device kit        hydrOXYzine (ATARAX) 10 MG tablet Take 10 mg by mouth daily as needed       blood glucose monitoring (ONE TOUCH ULTRASOFT) lancets        nystatin (MYCOSTATIN) cream Apply topically daily as needed       Respiratory Therapy Supplies (NEBULIZER) MARTHA        ketoconazole (NIZORAL) 2 % shampoo daily as needed       promethazine (PHENERGAN) 6.25 " "MG/5ML syrup daily as needed       cetirizine (ZYRTEC) 10 MG tablet Take 1 tablet (10 mg) by mouth daily 30 tablet 11     promethazine (PHENERGAN) 25 MG tablet Take 1 tablet (25 mg) by mouth At Bedtime 56 tablet 11     LORazepam (ATIVAN) 1 MG tablet Take 1 tablet (1 mg) by mouth 2 times daily 60 tablet 0     ranitidine (ZANTAC) 300 MG tablet Take 1 tablet (300 mg) by mouth 2 times daily 60 tablet 0     hydrOXYzine (VISTARIL) 25 MG capsule Take 2 capsules (50 mg) by mouth daily as needed 120 capsule 0     fluticasone-salmeterol (ADVAIR) 250-50 MCG/DOSE diskus inhaler Inhale 1 puff into the lungs 2 times daily as needed 1 Inhaler 0     diphenhydrAMINE (BENADRYL) 12.5 MG/5ML elixir Take 15 mLs by mouth 4 times daily as needed for allergies or sleep       Levothyroxine Sodium (SYNTHROID PO) Take 200 mcg by mouth daily       albuterol (PROAIR HFA) 108 (90 BASE) MCG/ACT inhaler Inhale 1-2 puffs into the lungs every 6 hours as needed. 1 Inhaler 2     ASPIRIN NOT PRESCRIBED, INTENTIONAL, Antiplatelet medication not prescribed intentionally due to age  0     ipratropium - albuterol 0.5 mg/2.5 mg/3 mL (DUONEB) 0.5-2.5 (3) MG/3ML nebulization Take 3 mLs by nebulization every 6 hours as needed for shortness of breath / dyspnea. 1 Box 3     Social History   Substance Use Topics     Smoking status: Former Smoker     Quit date: 4/2/2011     Smokeless tobacco: Never Used     Alcohol use No       ROS:  CONSTITUTIONAL:POSITIVE  for fever   INTEGUMENTARY/SKIN: POSITIVE for rash toes right    EXAM:   /74  Pulse 91  Temp 98.1  F (36.7  C) (Tympanic)  Ht 5' 6\" (1.676 m)  Wt 296 lb (134.3 kg)  SpO2 97%  BMI 47.78 kg/m2  GENERAL: alert, no acute distress.  SKIN: Rash description:    Distribution: localized  Location: toe  2nd right. Nail shorter with cutting. Lateral borders show no edema or erythema. No discharge.     GENERAL APPEARANCE: healthy, alert and no distress    ASSESSMENT:    1. Ingrown toenail without " infection  Mild and improving    PLAN:  1) daily foot soaks.   2) Follow-up with primary clinic if not improving over the next week.

## 2017-06-17 NOTE — NURSING NOTE
"Chief Complaint   Patient presents with     Urgent Care     Pt in clinic to have eval for right foot 2nd toe pain and fever.     Fever     Toenail       Initial /74  Pulse 91  Temp 98.1  F (36.7  C) (Tympanic)  Ht 5' 6\" (1.676 m)  Wt 296 lb (134.3 kg)  SpO2 97%  BMI 47.78 kg/m2 Estimated body mass index is 47.78 kg/(m^2) as calculated from the following:    Height as of this encounter: 5' 6\" (1.676 m).    Weight as of this encounter: 296 lb (134.3 kg).  Medication Reconciliation: complete   Doris Grady/ MA    "

## 2017-06-17 NOTE — MR AVS SNAPSHOT
After Visit Summary   6/17/2017    Cheli Figueroa    MRN: [id number]           Patient Information     Date Of Birth          1980        Visit Information        Provider Department      6/17/2017 1:55 PM Jose Grady PA-C [facility]        Today's Diagnoses     Ingrown toenail without infection    -  1       Follow-ups after your visit        Your next 10 appointments already scheduled     Jan 19, 2018  9:30 AM CST   (Arrive by 9:15 AM)   Return Visit with Gary Washington MD   [facility] ([facility] [place])    [address] [place] [id number] [phone]              Who to contact     If you have questions or need follow up information about today's clinic visit or your schedule please contact [facility] directly at [phone].  Normal or non-critical lab and imaging results will be communicated to you by MyChart, letter or phone within 4 business days after the clinic has received the results. If you do not hear from us within 7 days, please contact the clinic through MyChart or phone. If you have a critical or abnormal lab result, we will notify you by phone as soon as possible.  Submit refill requests through [employer] or call your pharmacy and they will forward the refill request to us. Please allow 3 business days for your refill to be completed.          Additional Information About Your Visit        MyChart Information     [employer] gives you secure access to your electronic health record. If you see a primary care provider, you can also send messages to your care team and make appointments. If you have questions, please call your primary care clinic.  If you do not have a primary care provider, please call [phone] and they will assist you.        Care EveryWhere ID     This is your Care EveryWhere ID. This could be used by other organizations to access your  "[place] medical records  [id number]        Your Vitals Were     Pulse Temperature Height Pulse Oximetry BMI (Body Mass Index)       91 98.1  F (36.7  C) (Tympanic) 5' 6\" (1.676 m) 97% 47.78 kg/m2        Blood Pressure from Last 3 Encounters:   06/17/17 119/74   06/13/17 120/66   04/03/17 122/83    Weight from Last 3 Encounters:   06/17/17 296 lb (134.3 kg)   06/13/17 296 lb 8 oz (134.5 kg)   04/03/17 284 lb (128.8 kg)              Today, you had the following     No orders found for display       Primary Care Provider Office Phone # Fax #    Frances Schroeder [phone]       [facility] [address] [address]  [facility] [address]        Thank you!     Thank you for choosing [facility]  for your care. Our goal is always to provide you with excellent care. Hearing back from our patients is one way we can continue to improve our services. Please take a few minutes to complete the written survey that you may receive in the mail after your visit with us. Thank you!             Your Updated Medication List - Protect others around you: Learn how to safely use, store and throw away your medicines at www.disposemymeds.org.          This list is accurate as of: 6/17/17  3:02 PM.  Always use your most recent med list.                   Brand Name Dispense Instructions for use    * albuterol 108 (90 BASE) MCG/ACT Inhaler   Generic drug:  albuterol     1 Inhaler    Inhale 1-2 puffs into the lungs every 6 hours as needed.       * albuterol (2.5 MG/3ML) 0.083% neb solution      Inhale 2.5 mg into the lungs daily as needed       ASPIRIN NOT PRESCRIBED    INTENTIONAL     Antiplatelet medication not prescribed intentionally due to age       ATIVAN 1 MG tablet   Generic drug:  LORazepam     60 tablet    Take 1 tablet (1 mg) by mouth 2 times daily       blood glucose monitoring lancets          blood glucose monitoring meter device kit          cetirizine 10 MG tablet    " "zyrTEC    30 tablet    Take 1 tablet (10 mg) by mouth daily       DEXCOM G4 PLATINUM  Kit          diphenhydrAMINE 12.5 MG/5ML solution    BENADRYL     Take 15 mLs by mouth 4 times daily as needed for allergies or sleep       EPINEPHrine 0.3 MG/0.3ML injection      Inject 0.3 mg into the muscle daily as needed       fluticasone-salmeterol 250-50 MCG/DOSE diskus inhaler    ADVAIR    1 Inhaler    Inhale 1 puff into the lungs 2 times daily as needed       GLUCAGON EMERGENCY 1 MG kit   Generic drug:  glucagon      Inject 1 mg Subcutaneous daily as needed       hydrOXYzine 10 MG tablet    ATARAX     Take 10 mg by mouth daily as needed       hydrOXYzine 25 MG capsule    VISTARIL    120 capsule    Take 2 capsules (50 mg) by mouth daily as needed       infusion set misc pump supply      Infusion Set 23 \" 6MM       insulin infusion pump kit      Insulin pump       insulin syringe 31G X 5/16\" 0.5 ML Misc          ipratropium - albuterol 0.5 mg/2.5 mg/3 mL 0.5-2.5 (3) MG/3ML neb solution    DUONEB    1 Box    Take 3 mLs by nebulization every 6 hours as needed for shortness of breath / dyspnea.       ketoconazole 2 % shampoo    NIZORAL     daily as needed       montelukast 10 MG tablet    SINGULAIR    180 tablet    Take 1 tablet (10 mg) by mouth 2 times daily       nebulizer Debbie          NovoLOG VIAL 100 UNITS/ML injection   Generic drug:  insulin aspart      64 basal units and sliding scale for bolus       nystatin cream    MYCOSTATIN     Apply topically daily as needed       * promethazine 6.25 MG/5ML syrup    PHENERGAN     daily as needed       * promethazine 25 MG tablet    PHENERGAN    56 tablet    Take 1 tablet (25 mg) by mouth At Bedtime       SYNTHROID PO      Take 200 mcg by mouth daily       ZANTAC 300 MG tablet   Generic drug:  ranitidine     60 tablet    Take 1 tablet (300 mg) by mouth 2 times daily       * Notice:  This list has 4 medication(s) that are the same as other medications prescribed for you. " Read the directions carefully, and ask your doctor or other care provider to review them with you.

## 2017-10-29 ENCOUNTER — HEALTH MAINTENANCE LETTER (OUTPATIENT)
Age: 37
End: 2017-10-29

## 2020-03-01 ENCOUNTER — HEALTH MAINTENANCE LETTER (OUTPATIENT)
Age: 40
End: 2020-03-01

## 2020-10-09 DIAGNOSIS — E10.9 TYPE 1 DIABETES, HBA1C GOAL < 7% (H): ICD-10-CM

## 2020-10-09 NOTE — TELEPHONE ENCOUNTER
Short Acting Insulin Protocol Qzohbr42/09/2020 06:12 AM   Serum creatinine on file in past 12 months Protocol Details    HgbA1C in past 3 or 6 months     Recent (6 mo) or future (30 days) visit within the authorizing provider's specialty

## 2020-10-09 NOTE — TELEPHONE ENCOUNTER
NOVOLOG FLEXPEN 100 UNIT/ML injection (Discontinued Med)        Last Written Prescription Date:  08/09/12  Last Fill Quantity: 3 month,   # refills: 3  Last Office Visit : 11/15/11  Future Office visit:  None scheduled    Routing refill request to provider for review/approval because:  Insulin - refilled per clinic

## 2020-12-14 ENCOUNTER — HEALTH MAINTENANCE LETTER (OUTPATIENT)
Age: 40
End: 2020-12-14

## 2021-04-17 ENCOUNTER — HEALTH MAINTENANCE LETTER (OUTPATIENT)
Age: 41
End: 2021-04-17

## 2021-08-07 ENCOUNTER — HEALTH MAINTENANCE LETTER (OUTPATIENT)
Age: 41
End: 2021-08-07

## 2021-10-02 ENCOUNTER — HEALTH MAINTENANCE LETTER (OUTPATIENT)
Age: 41
End: 2021-10-02

## 2022-03-19 ENCOUNTER — HEALTH MAINTENANCE LETTER (OUTPATIENT)
Age: 42
End: 2022-03-19

## 2022-05-14 ENCOUNTER — HEALTH MAINTENANCE LETTER (OUTPATIENT)
Age: 42
End: 2022-05-14

## 2022-09-03 ENCOUNTER — HEALTH MAINTENANCE LETTER (OUTPATIENT)
Age: 42
End: 2022-09-03

## 2023-01-14 ENCOUNTER — HEALTH MAINTENANCE LETTER (OUTPATIENT)
Age: 43
End: 2023-01-14

## 2023-06-02 ENCOUNTER — HEALTH MAINTENANCE LETTER (OUTPATIENT)
Age: 43
End: 2023-06-02

## 2023-07-22 ENCOUNTER — HEALTH MAINTENANCE LETTER (OUTPATIENT)
Age: 43
End: 2023-07-22